# Patient Record
Sex: MALE | Race: WHITE | NOT HISPANIC OR LATINO | URBAN - METROPOLITAN AREA
[De-identification: names, ages, dates, MRNs, and addresses within clinical notes are randomized per-mention and may not be internally consistent; named-entity substitution may affect disease eponyms.]

---

## 2022-10-11 ENCOUNTER — HOSPITAL ENCOUNTER (OUTPATIENT)
Dept: TELEMEDICINE | Facility: HOSPITAL | Age: 52
Discharge: HOME | End: 2022-10-11

## 2022-10-11 DIAGNOSIS — R20.2 PARESTHESIAS WITH SUBJECTIVE WEAKNESS: ICD-10-CM

## 2022-10-11 DIAGNOSIS — R53.1 PARESTHESIAS WITH SUBJECTIVE WEAKNESS: ICD-10-CM

## 2022-10-11 PROCEDURE — G0425 INPT/ED TELECONSULT30: HCPCS | Mod: GT,,, | Performed by: STUDENT IN AN ORGANIZED HEALTH CARE EDUCATION/TRAINING PROGRAM

## 2022-10-11 PROCEDURE — G0425 PR INPT TELEHEALTH CONSULT 30M: ICD-10-PCS | Mod: GT,,, | Performed by: STUDENT IN AN ORGANIZED HEALTH CARE EDUCATION/TRAINING PROGRAM

## 2022-10-11 NOTE — HPI
Patient is a 51 yo male w/ PMHx of HLD, HTN, Tobacco abuse, PTSD, gout, recent admission to OSH w/ LSW on 9/20/2022 w/ tPA administration (of note MRI negative based on chart review) who presents with persistent but worsening symptoms since his admission on 9/20/2022.  Presents after worsening numbness and LLE pain radiating upwards that is worse today upon awakening at 6:30 AM today.   States that the symptoms never completely resolved after his admission on 9/20, but today they are worse, particularly worsening numbness.   He is also saying that it's difficult to speak today, stating that his tongue feels heavy.   Denies any other symptoms.

## 2022-10-11 NOTE — SUBJECTIVE & OBJECTIVE
Woke up with symptoms?: yes    Recent bleeding noted: no  Does the patient take any Blood Thinners? yes  Medications: Antiplatelets:  aspirin and clopidogrel/Plavix      Past Medical History: hypertension and hyperlipidemia    Past Surgical History: no major surgeries within the last 2 weeks    Family History: no relevant history    Social History: smoker (active)    Allergies: Allergies have not been reviewed No relevant allergies    Review of Systems   Constitutional: Negative.  Negative for diaphoresis and fatigue.   HENT: Negative.  Negative for trouble swallowing.    Eyes: Negative.  Negative for pain.   Respiratory: Negative.  Negative for shortness of breath.    Cardiovascular: Negative.    Gastrointestinal: Negative.  Negative for nausea and vomiting.   Endocrine: Negative.    Genitourinary: Negative.    Musculoskeletal: Negative.    Skin: Negative.    Allergic/Immunologic: Negative.    Neurological: Positive for speech difficulty and numbness.   Hematological: Negative.    Psychiatric/Behavioral: Negative.      Objective:   Vitals: There were no vitals taken for this visit. BP: 147/100 and Heart Rate: 73    CT READ: No    Physical Exam  Constitutional:       Appearance: Normal appearance.   HENT:      Head: Normocephalic and atraumatic.   Eyes:      Extraocular Movements: Extraocular movements intact.   Cardiovascular:      Rate and Rhythm: Normal rate.   Pulmonary:      Effort: Pulmonary effort is normal.   Musculoskeletal:         General: Normal range of motion.   Neurological:      Mental Status: He is alert and oriented to person, place, and time.      Cranial Nerves: No cranial nerve deficit.      Sensory: Sensory deficit present.      Motor: No weakness.      Comments: Reports LLE pain that radiates from his foot up to the head and back down.   States that his numbness is worse today.   No drift noted in any of the extremities.

## 2022-10-11 NOTE — CONSULTS
[facility]  Vascular Neurology  Comprehensive Stroke Center  TeleVascular Neurology Acute Consultation Note      Consults    Consulting Provider: WYATT KILGORE  Current Providers  No providers found    Patient Location: [facility] Emergency Department  Spoke hospital nurse at bedside with patient assisting consultant.     Patient information was obtained from patient, past medical records, and ER records.         Assessment/Plan:       Diagnoses:   * Paresthesias with subjective weakness  Patient is a 53 yo male w/ PMHx of HLD, HTN, Tobacco abuse, PTSD, gout, recent admission to [facility] w/ LSW on 9/20/2022 w/ tPA administration (of note MRI negative based on chart review) who presents with persistent but worsening symptoms since his admission on 9/20/2022.  Presents after worsening numbness and LLE pain radiating upwards that is worse today upon awakening at 6:30 AM today.   States that the symptoms never completely resolved after his admission on 9/20, but today they are worse, particularly worsening numbness.   He is also saying that it's difficult to speak today, stating that his tongue feels heavy.   Denies any other symptoms.     Patient is not a tPA candidate as these are the same symptoms as previously described w/ some fluctuating today as well as being OOW for tPA.     Per chart review, MRI negative on initial admission at the end of last month.     Agree w/ admission for observation and evaluation by Neurology w/ full neuro exam including reflexes, multi-modal sensory exam.   Can consider repeat MRI.     If examination is inconsistent w/ weakness of central origin, consider alternative etiologies.    Continue secondary stroke prevention modalities at this time.               STROKE DOCUMENTATION     Acute Stroke Times:   Acute Stroke Times   Last Known Normal Date: 10/10/22  Last Known Normal Time: 2200  Symptom Onset Date:  10/11/22  Symptom Onset Time: 0630  Stroke Team Called Date: 10/11/22  Stroke Team Called Time: 1352  Stroke Team Arrival Date: 10/11/22  Stroke Team Arrival Time: 1356  CT completed but images not available for review - spoke to document results:  (Reportedly negative CTH as per ED physiciam )  Alteplase Recommended: No    NIH Scale:  1a. Level of Consciousness: 0-->Alert, keenly responsive  1b. LOC Questions: 0-->Answers both questions correctly  1c. LOC Commands: 0-->Performs both tasks correctly  2. Best Gaze: 0-->Normal  3. Visual: 0-->No visual loss  4. Facial Palsy: 0-->Normal symmetrical movements  5a. Motor Arm, Left: 0-->No drift, limb holds 90 (or 45) degrees for full 10 secs  5b. Motor Arm, Right: 0-->No drift, limb holds 90 (or 45) degrees for full 10 secs  6a. Motor Leg, Left: 0-->No drift, leg holds 30 degree position for full 5 secs  6b. Motor Leg, Right: 0-->No drift, leg holds 30 degree position for full 5 secs  7. Limb Ataxia: 0-->Absent  8. Sensory: 1-->Mild-to-moderate sensory loss, patient feels pinprick is less sharp or is dull on the affected side, or there is a loss of superficial pain with pinprick, but patient is aware of being touched  9. Best Language: 0-->No aphasia, normal  10. Dysarthria: 0-->Normal  11. Extinction and Inattention (formerly Neglect): 0-->No abnormality  Total (NIH Stroke Scale): 1     Modified Vilma Score: 0  Filiberto Coma Scale:    ABCD2 Score:    [id number] Score:   HAS -BLED Score:   ICH Score:   Hunt & Monique Classification:       There were no vitals taken for this visit.  Alteplase Eligible?: No  Alteplase Recommendation: Alteplase not recommended due to Outside of treatment window   Possible Interventional Revascularization Candidate? No; at this time symptoms not suggestive of large vessel occlusion    Disposition Recommendation: remains as inpatient at [facility]     Subjective:     History of Present Illness:  Patient is a 53 yo male w/ PMHx of HLD, HTN, Tobacco  abuse, PTSD, gout, recent admission to [facility] w/ LSW on 9/20/2022 w/ tPA administration (of note MRI negative based on chart review) who presents with persistent but worsening symptoms since his admission on 9/20/2022.  Presents after worsening numbness and LLE pain radiating upwards that is worse today upon awakening at 6:30 AM today.   States that the symptoms never completely resolved after his admission on 9/20, but today they are worse, particularly worsening numbness.   He is also saying that it's difficult to speak today, stating that his tongue feels heavy.   Denies any other symptoms.       Woke up with symptoms?: yes    Recent bleeding noted: no  Does the patient take any Blood Thinners? yes  Medications: Antiplatelets:  aspirin and clopidogrel/Plavix      Past Medical History: hypertension and hyperlipidemia    Past Surgical History: no major surgeries within the last 2 weeks    Family History: no relevant history    Social History: smoker (active)    Allergies: Allergies have not been reviewed No relevant allergies    Review of Systems   Constitutional: Negative.  Negative for diaphoresis and fatigue.   HENT: Negative.  Negative for trouble swallowing.    Eyes: Negative.  Negative for pain.   Respiratory: Negative.  Negative for shortness of breath.    Cardiovascular: Negative.    Gastrointestinal: Negative.  Negative for nausea and vomiting.   Endocrine: Negative.    Genitourinary: Negative.    Musculoskeletal: Negative.    Skin: Negative.    Allergic/Immunologic: Negative.    Neurological: Positive for speech difficulty and numbness.   Hematological: Negative.    Psychiatric/Behavioral: Negative.      Objective:   Vitals: There were no vitals taken for this visit. BP: 147/100 and Heart Rate: 73    CT READ: No    Physical Exam  Constitutional:       Appearance: Normal appearance.   HENT:      Head: Normocephalic and atraumatic.   Eyes:      Extraocular Movements: Extraocular movements intact.    Cardiovascular:      Rate and Rhythm: Normal rate.   Pulmonary:      Effort: Pulmonary effort is normal.   Musculoskeletal:         General: Normal range of motion.   Neurological:      Mental Status: He is alert and oriented to person, place, and time.      Cranial Nerves: No cranial nerve deficit.      Sensory: Sensory deficit present.      Motor: No weakness.      Comments: Reports LLE pain that radiates from his foot up to the head and back down.   States that his numbness is worse today.   No drift noted in any of the extremities.              Recommended the emergency room physician to have a brief discussion with the patient and/or family if available regarding the  risks and benefits of treatment, and to briefly document the occurrence of that discussion in his clinical encounter note.     The attending portion of this evaluation, treatment, and documentation was performed per Fouzia Conde MD via audiovisual.    Billing code:  (non-intervention mild to moderate stroke, TIA, some mimics)      This patient has a critical neurological condition/illness, with some potential for high morbidity and mortality.  There is a moderate probability for acute neurological change leading to clinical and possibly life-threatening deterioration requiring highest level of physician preparedness for urgent intervention.  Care was coordinated with other physicians involved in the patient's care.  Radiologic studies and laboratory data were reviewed and interpreted, and plan of care was re-assessed based on the results.  Diagnosis, treatment options and prognosis may have been discussed with the patient and/or family members or caregiver.    In your opinion, this was a: Tier 2 Van Negative    Consult End Time: 3:46 PM     Fouzia Conde MD  [facility] Stroke [facility]  Vascular Neurology   [facility]

## 2022-10-11 NOTE — ASSESSMENT & PLAN NOTE
Patient is a 53 yo male w/ PMHx of HLD, HTN, Tobacco abuse, PTSD, gout, recent admission to OSH w/ LSW on 9/20/2022 w/ tPA administration (of note MRI negative based on chart review) who presents with persistent but worsening symptoms since his admission on 9/20/2022.  Presents after worsening numbness and LLE pain radiating upwards that is worse today upon awakening at 6:30 AM today.   States that the symptoms never completely resolved after his admission on 9/20, but today they are worse, particularly worsening numbness.   He is also saying that it's difficult to speak today, stating that his tongue feels heavy.   Denies any other symptoms.     Patient is not a tPA candidate as these are the same symptoms as previously described w/ some fluctuating today as well as being OOW for tPA.     Per chart review, MRI negative on initial admission at the end of last month.     Agree w/ admission for observation and evaluation by Neurology w/ full neuro exam including reflexes, multi-modal sensory exam.   Can consider repeat MRI.     If examination is inconsistent w/ weakness of central origin, consider alternative etiologies.    Continue secondary stroke prevention modalities at this time.

## 2023-06-07 DIAGNOSIS — J84.10 PULMONARY FIBROSIS, UNSPECIFIED: Primary | ICD-10-CM

## 2023-10-16 ENCOUNTER — TELEPHONE (OUTPATIENT)
Dept: PULMONOLOGY | Facility: CLINIC | Age: 53
End: 2023-10-16
Payer: OTHER GOVERNMENT

## 2023-11-14 LAB — CRC RECOMMENDATION EXT: NORMAL

## 2024-02-02 LAB
LEFT EYE DM RETINOPATHY: NEGATIVE
RIGHT EYE DM RETINOPATHY: NEGATIVE

## 2024-04-16 DIAGNOSIS — J84.10 PULMONARY FIBROSIS: Primary | ICD-10-CM

## 2024-04-17 DIAGNOSIS — J84.10 PULMONARY FIBROSIS: ICD-10-CM

## 2024-04-17 DIAGNOSIS — R06.02 SOB (SHORTNESS OF BREATH): Primary | ICD-10-CM

## 2025-04-02 ENCOUNTER — OFFICE VISIT (OUTPATIENT)
Dept: FAMILY MEDICINE | Facility: CLINIC | Age: 55
End: 2025-04-02
Payer: OTHER GOVERNMENT

## 2025-04-02 VITALS
DIASTOLIC BLOOD PRESSURE: 82 MMHG | TEMPERATURE: 99 F | HEIGHT: 71 IN | SYSTOLIC BLOOD PRESSURE: 118 MMHG | HEART RATE: 88 BPM | WEIGHT: 213.81 LBS | RESPIRATION RATE: 18 BRPM | BODY MASS INDEX: 29.93 KG/M2 | OXYGEN SATURATION: 98 %

## 2025-04-02 DIAGNOSIS — E78.2 MIXED HYPERLIPIDEMIA: ICD-10-CM

## 2025-04-02 DIAGNOSIS — E11.9 TYPE 2 DIABETES MELLITUS WITHOUT COMPLICATION, WITHOUT LONG-TERM CURRENT USE OF INSULIN: ICD-10-CM

## 2025-04-02 DIAGNOSIS — J44.9 CHRONIC OBSTRUCTIVE PULMONARY DISEASE, UNSPECIFIED COPD TYPE: ICD-10-CM

## 2025-04-02 DIAGNOSIS — N18.2 CKD (CHRONIC KIDNEY DISEASE) STAGE 2, GFR 60-89 ML/MIN: ICD-10-CM

## 2025-04-02 DIAGNOSIS — I63.9 CEREBROVASCULAR ACCIDENT (CVA), UNSPECIFIED MECHANISM: ICD-10-CM

## 2025-04-02 DIAGNOSIS — I10 PRIMARY HYPERTENSION: ICD-10-CM

## 2025-04-02 DIAGNOSIS — K80.20 GALLSTONES: ICD-10-CM

## 2025-04-02 DIAGNOSIS — R91.1 LUNG NODULE: ICD-10-CM

## 2025-04-02 DIAGNOSIS — G89.29 CHRONIC BILATERAL LOW BACK PAIN WITHOUT SCIATICA: Primary | ICD-10-CM

## 2025-04-02 DIAGNOSIS — M54.50 CHRONIC BILATERAL LOW BACK PAIN WITHOUT SCIATICA: Primary | ICD-10-CM

## 2025-04-02 DIAGNOSIS — R31.9 HEMATURIA, UNSPECIFIED TYPE: ICD-10-CM

## 2025-04-02 PROBLEM — R53.1 PARESTHESIAS WITH SUBJECTIVE WEAKNESS: Status: RESOLVED | Noted: 2022-10-11 | Resolved: 2025-04-02

## 2025-04-02 PROBLEM — R20.2 PARESTHESIAS WITH SUBJECTIVE WEAKNESS: Status: RESOLVED | Noted: 2022-10-11 | Resolved: 2025-04-02

## 2025-04-02 PROBLEM — E78.5 HLD (HYPERLIPIDEMIA): Status: ACTIVE | Noted: 2025-04-02

## 2025-04-02 RX ORDER — PROPRANOLOL HYDROCHLORIDE 20 MG/1
10 TABLET ORAL
COMMUNITY
Start: 2025-02-18

## 2025-04-02 RX ORDER — ALPRAZOLAM 0.25 MG/1
TABLET ORAL
COMMUNITY
Start: 2024-11-12

## 2025-04-02 RX ORDER — CLOPIDOGREL BISULFATE 75 MG/1
75 TABLET ORAL
COMMUNITY
Start: 2024-11-13

## 2025-04-02 RX ORDER — GABAPENTIN 300 MG/1
300 CAPSULE ORAL NIGHTLY
Qty: 30 CAPSULE | Refills: 2 | Status: SHIPPED | OUTPATIENT
Start: 2025-04-02 | End: 2026-04-02

## 2025-04-02 RX ORDER — ATORVASTATIN CALCIUM 40 MG/1
40 TABLET, FILM COATED ORAL
COMMUNITY
Start: 2024-04-23

## 2025-04-02 RX ORDER — LISINOPRIL AND HYDROCHLOROTHIAZIDE 20; 25 MG/1; MG/1
1 TABLET ORAL DAILY PRN
COMMUNITY
Start: 2024-04-23

## 2025-04-02 RX ORDER — HYDROCODONE BITARTRATE AND ACETAMINOPHEN 5; 325 MG/1; MG/1
1 TABLET ORAL EVERY 8 HOURS PRN
Qty: 10 TABLET | Refills: 0 | Status: SHIPPED | OUTPATIENT
Start: 2025-04-02
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
## 2025-04-03 LAB — HBA1C MFR BLD: 6 %

## 2025-04-07 RX ORDER — LISINOPRIL AND HYDROCHLOROTHIAZIDE 20; 25 MG/1; MG/1
1 TABLET ORAL DAILY
Qty: 90 TABLET | Refills: 1 | Status: SHIPPED | OUTPATIENT
Start: 2025-04-07

## 2025-04-16 ENCOUNTER — TELEPHONE (OUTPATIENT)
Dept: FAMILY MEDICINE | Facility: CLINIC | Age: 55
End: 2025-04-16
Payer: OTHER GOVERNMENT

## 2025-04-16 DIAGNOSIS — J44.9 CHRONIC OBSTRUCTIVE PULMONARY DISEASE, UNSPECIFIED COPD TYPE: Primary | ICD-10-CM

## 2025-04-16 RX ORDER — ALBUTEROL SULFATE 90 UG/1
2 INHALANT RESPIRATORY (INHALATION) EVERY 6 HOURS PRN
Qty: 18 G | Refills: 0 | Status: SHIPPED | OUTPATIENT
Start: 2025-04-16 | End: 2026-04-16

## 2025-04-24 ENCOUNTER — TELEPHONE (OUTPATIENT)
Dept: FAMILY MEDICINE | Facility: CLINIC | Age: 55
End: 2025-04-24
Payer: OTHER GOVERNMENT

## 2025-04-24 NOTE — TELEPHONE ENCOUNTER
Pt called reporting continued SOB, darnell only working for 2h at a time. Per previous encounter, pt instructed to  Breztri samples per Dr. Archuleta, and notified of ventolin sent to pharmacy. Pt verbalized understanding.

## 2025-05-05 PROBLEM — K80.20 CHOLELITHIASIS: Status: ACTIVE | Noted: 2025-05-05

## 2025-05-06 PROBLEM — M48.00 SPINAL STENOSIS: Status: ACTIVE | Noted: 2025-05-06

## 2025-05-06 PROBLEM — N52.9 ERECTILE DISORDER: Status: ACTIVE | Noted: 2025-05-06

## 2025-05-06 PROBLEM — K20.90 ESOPHAGITIS: Status: ACTIVE | Noted: 2025-05-06

## 2025-05-06 PROBLEM — E53.8 B12 DEFICIENCY: Status: ACTIVE | Noted: 2025-05-06

## 2025-05-06 PROBLEM — E55.9 VITAMIN D DEFICIENCY: Status: ACTIVE | Noted: 2025-05-06

## 2025-05-06 PROBLEM — F43.10 PTSD (POST-TRAUMATIC STRESS DISORDER): Status: ACTIVE | Noted: 2025-05-06

## 2025-05-06 PROBLEM — Z12.11 SCREENING FOR MALIGNANT NEOPLASM OF COLON: Status: ACTIVE | Noted: 2025-05-06

## 2025-05-06 PROBLEM — G47.33 OSA (OBSTRUCTIVE SLEEP APNEA): Status: ACTIVE | Noted: 2025-05-06

## 2025-05-06 PROBLEM — Z12.5 SCREENING FOR MALIGNANT NEOPLASM OF PROSTATE: Status: ACTIVE | Noted: 2025-05-06

## 2025-05-06 PROBLEM — F32.A DEPRESSION: Status: ACTIVE | Noted: 2025-05-06

## 2025-05-14 ENCOUNTER — PATIENT OUTREACH (OUTPATIENT)
Dept: ADMINISTRATIVE | Facility: HOSPITAL | Age: 55
End: 2025-05-14
Payer: OTHER GOVERNMENT

## 2025-05-14 NOTE — PROGRESS NOTES
Manually uploaded and hyper-linked DM EYE EXAM 02/02/24  Manually uploaded and hyper-linked COLONOSCOPY & PATHOLOGY 11/14/23

## 2025-06-23 ENCOUNTER — TELEPHONE (OUTPATIENT)
Dept: FAMILY MEDICINE | Facility: CLINIC | Age: 55
End: 2025-06-23
Payer: OTHER GOVERNMENT

## 2025-06-23 NOTE — TELEPHONE ENCOUNTER
Copied from CRM #[id number]. Topic: General Inquiry - Patient Advice  >> Jun 19, 2025  2:56 PM Lora wrote:  Type:  Patient Requesting Call Back    Who Called:Curtis Mari  Does the patient know what this is regarding?:pt fell and rt knee is bothering him  Would the patient rather a call back or a response via [facility]?   Best Call Back Number:[phone]  Additional Information: n/a

## 2025-07-01 ENCOUNTER — PATIENT MESSAGE (OUTPATIENT)
Dept: FAMILY MEDICINE | Facility: CLINIC | Age: 55
End: 2025-07-01

## 2025-07-01 ENCOUNTER — OFFICE VISIT (OUTPATIENT)
Dept: FAMILY MEDICINE | Facility: CLINIC | Age: 55
End: 2025-07-01
Payer: OTHER GOVERNMENT

## 2025-07-01 VITALS
RESPIRATION RATE: 20 BRPM | HEIGHT: 71 IN | TEMPERATURE: 97 F | DIASTOLIC BLOOD PRESSURE: 90 MMHG | WEIGHT: 224 LBS | BODY MASS INDEX: 31.36 KG/M2 | OXYGEN SATURATION: 98 % | HEART RATE: 97 BPM | SYSTOLIC BLOOD PRESSURE: 134 MMHG

## 2025-07-01 DIAGNOSIS — R21 RASH: ICD-10-CM

## 2025-07-01 DIAGNOSIS — M17.0 PRIMARY OSTEOARTHRITIS OF BOTH KNEES: Primary | ICD-10-CM

## 2025-07-01 DIAGNOSIS — M54.50 CHRONIC BILATERAL LOW BACK PAIN WITHOUT SCIATICA: ICD-10-CM

## 2025-07-01 DIAGNOSIS — J44.9 CHRONIC OBSTRUCTIVE PULMONARY DISEASE, UNSPECIFIED COPD TYPE: ICD-10-CM

## 2025-07-01 DIAGNOSIS — R91.1 LUNG NODULE: ICD-10-CM

## 2025-07-01 DIAGNOSIS — G89.29 CHRONIC BILATERAL LOW BACK PAIN WITHOUT SCIATICA: ICD-10-CM

## 2025-07-01 DIAGNOSIS — R91.1 SOLITARY PULMONARY NODULE: ICD-10-CM

## 2025-07-01 PROCEDURE — 99214 OFFICE O/P EST MOD 30 MIN: CPT | Mod: S$GLB,,, | Performed by: FAMILY MEDICINE

## 2025-07-01 RX ORDER — IPRATROPIUM BROMIDE AND ALBUTEROL SULFATE 2.5; .5 MG/3ML; MG/3ML
3 SOLUTION RESPIRATORY (INHALATION) EVERY 6 HOURS PRN
Qty: 360 ML | Refills: 3 | Status: SHIPPED | OUTPATIENT
Start: 2025-07-01 | End: 2026-07-01

## 2025-07-01 RX ORDER — BETAMETHASONE VALERATE 1 MG/G
CREAM TOPICAL 2 TIMES DAILY
Qty: 45 G | Refills: 1 | Status: SHIPPED | OUTPATIENT
Start: 2025-07-01

## 2025-07-01 RX ORDER — GABAPENTIN 300 MG/1
300 CAPSULE ORAL 3 TIMES DAILY
Qty: 90 CAPSULE | Refills: 3 | Status: SHIPPED | OUTPATIENT
Start: 2025-07-01 | End: 2026-07-01

## 2025-07-01 RX ORDER — BUDESONIDE 0.5 MG/2ML
0.5 INHALANT ORAL 2 TIMES DAILY
Qty: 120 ML | Refills: 3 | Status: SHIPPED | OUTPATIENT
Start: 2025-07-01 | End: 2026-07-01

## 2025-07-01 NOTE — PROGRESS NOTES
Subjective:      Patient ID: Curtis Mari is a 55 y.o. male.    Chief Complaint: Follow-up, Knee Pain, and Rash      HPI:  55-year-old male who presents for shortness a breath.  Has cut back to smoking to half pack a day.  Worried he may have some COPD from [place]. Gets 1 or 2 hour improvement from Trelegy breast treat and albuterol inhaler.  Does have a rash on bilateral knees.  Itches.  Not sure where it came from.  Has bilateral knee pain.  Had surgery on his right knee.  Had injections with minimal improvement.  No improvement with back pain from gabapentin.  [facility]    Past Medical History:   Diagnosis Date    Depression     Diabetes mellitus, type 2     Hyperlipidemia     Hypertension     Stroke      Past Surgical History:   Procedure Laterality Date    JOINT REPLACEMENT Right      No family history on file.  Social History[1]  Review of patient's allergies indicates:  No Known Allergies    Review of Systems   Constitutional:  Negative for activity change, appetite change, chills, fatigue, fever and unexpected weight change.   HENT:  Positive for congestion. Negative for ear pain, rhinorrhea, sinus pressure, sinus pain, sneezing, sore throat and trouble swallowing.    Eyes:  Negative for photophobia, pain and itching.   Respiratory:  Positive for shortness of breath. Negative for cough, chest tightness and wheezing.    Cardiovascular:  Negative for chest pain, palpitations and leg swelling.   Gastrointestinal:  Negative for abdominal distention, abdominal pain, constipation, diarrhea, nausea and vomiting.   Endocrine: Negative for cold intolerance, heat intolerance, polydipsia and polyphagia.   Genitourinary:  Negative for difficulty urinating, dysuria and frequency.   Musculoskeletal:  Positive for arthralgias and back pain. Negative for joint swelling and myalgias.   Skin:  Positive for rash. Negative for pallor.   Neurological:  Negative for  "dizziness, seizures, syncope, speech difficulty and headaches.   Hematological:  Negative for adenopathy. Does not bruise/bleed easily.   Psychiatric/Behavioral:  Negative for agitation, behavioral problems and hallucinations.        Objective:       BP (!) 134/90 (BP Location: Left forearm, Patient Position: Sitting)   Pulse 97   Temp 97 °F (36.1 °C) (Oral)   Resp 20   Ht 5' 11" (1.803 m)   Wt 101.6 kg (224 lb)   SpO2 98%   BMI 31.24 kg/m²   Physical Exam  Vitals and nursing note reviewed.   Constitutional:       Appearance: He is well-developed.   HENT:      Head: Normocephalic and atraumatic.      Nose: Nose normal.   Eyes:      Conjunctiva/sclera: Conjunctivae normal.      Pupils: Pupils are equal, round, and reactive to light.   Cardiovascular:      Rate and Rhythm: Normal rate and regular rhythm.      Heart sounds: Normal heart sounds.   Pulmonary:      Effort: Pulmonary effort is normal.      Breath sounds: Wheezing, rhonchi and rales present.   Abdominal:      Palpations: Abdomen is soft.      Tenderness: There is no abdominal tenderness.   Musculoskeletal:         General: Normal range of motion.      Cervical back: Normal range of motion and neck supple.      Comments: Crepitus bilateral knees   Skin:     General: Skin is warm and dry.      Findings: Rash present.   Neurological:      Mental Status: He is alert and oriented to person, place, and time.   Psychiatric:         Behavior: Behavior normal.         Thought Content: Thought content normal.         Judgment: Judgment normal.         Assessment:     1. Primary osteoarthritis of both knees    2. Chronic obstructive pulmonary disease, unspecified COPD type    3. Lung nodule    4. Chronic bilateral low back pain without sciatica    5. Rash    6. Solitary pulmonary nodule        Plan:   Primary osteoarthritis of both knees  -     X-Ray Knee 1 or 2 View Bilateral; Future; Expected date: 07/01/2025    Chronic obstructive pulmonary disease, " unspecified COPD type  -     NEBULIZER FOR HOME USE  -     budesonide (PULMICORT) 0.5 mg/2 mL nebulizer solution; Take 2 mLs (0.5 mg total) by nebulization 2 (two) times daily. Controller  Dispense: 120 mL; Refill: 3  -     albuterol-ipratropium (DUO-NEB) 2.5 mg-0.5 mg/3 mL nebulizer solution; Take 3 mLs by nebulization every 6 (six) hours as needed for Wheezing. Rescue  Dispense: 360 mL; Refill: 3    Lung nodule    Chronic bilateral low back pain without sciatica  -     gabapentin (NEURONTIN) 300 MG capsule; Take 1 capsule (300 mg total) by mouth 3 (three) times daily.  Dispense: 90 capsule; Refill: 3    Rash  -     betamethasone valerate 0.1% (VALISONE) 0.1 % Crea; Apply topically 2 (two) times daily.  Dispense: 45 g; Refill: 1    Solitary pulmonary nodule  -     CT Chest Without Contrast; Future; Expected date: 07/01/2025      Order CT chest     Consider PFT     Trial of budesonide     Trial of albuterol     Increase gabapentin     Follow-up in 6-8 weeks.  Sooner if needed     [facility]    Medication List with Changes/Refills   New Medications    ALBUTEROL-IPRATROPIUM (DUO-NEB) 2.5 MG-0.5 MG/3 ML NEBULIZER SOLUTION    Take 3 mLs by nebulization every 6 (six) hours as needed for Wheezing. Rescue    BETAMETHASONE VALERATE 0.1% (VALISONE) 0.1 % CREA    Apply topically 2 (two) times daily.    BUDESONIDE (PULMICORT) 0.5 MG/2 ML NEBULIZER SOLUTION    Take 2 mLs (0.5 mg total) by nebulization 2 (two) times daily. Controller   Current Medications    ALBUTEROL (VENTOLIN HFA) 90 MCG/ACTUATION INHALER    Inhale 2 puffs into the lungs every 6 (six) hours as needed for Wheezing. Rescue    ALPRAZOLAM (XANAX) 0.25 MG TABLET        ATORVASTATIN (LIPITOR) 40 MG TABLET    Take 40 mg by mouth.    BREXPIPRAZOLE (REXULTI) 3 MG TAB    Take 3 mg by mouth.    BUSPIRONE HCL (BUSPIRONE ORAL)        CLOPIDOGREL (PLAVIX) 75 MG TABLET    Take 75 mg by mouth.    HYDROCODONE-ACETAMINOPHEN (NORCO) 5-325 MG PER TABLET    Take  1 tablet by mouth every 8 (eight) hours as needed for Pain.    LISINOPRIL-HYDROCHLOROTHIAZIDE (PRINZIDE,ZESTORETIC) 20-25 MG TAB    Take 1 tablet by mouth once daily.    PROPRANOLOL (INDERAL) 20 MG TABLET    Take 10 mg by mouth.    VENLAFAXINE HCL (EFFEXOR XR ORAL)       Changed and/or Refilled Medications    Modified Medication Previous Medication    GABAPENTIN (NEURONTIN) 300 MG CAPSULE gabapentin (NEURONTIN) 300 MG capsule       Take 1 capsule (300 mg total) by mouth 3 (three) times daily.    Take 1 capsule (300 mg total) by mouth every evening.            Disclaimer: This note may have been prepared using voice recognition software, it may have not been extensively proofed, as such there could be errors within the text such as sound alike errors.          [1]   Social History  Socioeconomic History    Marital status: Unknown   Tobacco Use    Smoking status: Never    Smokeless tobacco: Never     Social Drivers of Health     Financial Resource Strain: Low Risk  (3/26/2025)    Overall Financial Resource Strain (CARDIA)     Difficulty of Paying Living Expenses: Not hard at all   Food Insecurity: No Food Insecurity (3/26/2025)    Hunger Vital Sign     Worried About Running Out of Food in the Last Year: Never true     Ran Out of Food in the Last Year: Never true   Transportation Needs: No Transportation Needs (3/26/2025)    PRAPARE - Transportation     Lack of Transportation (Medical): No     Lack of Transportation (Non-Medical): No   Physical Activity: Inactive (3/26/2025)    Exercise Vital Sign     Days of Exercise per Week: 0 days     Minutes of Exercise per Session: 0 min   Stress: Stress Concern Present (3/26/2025)    [employer] - Occupational Stress Questionnaire     Feeling of Stress : Very much   Housing Stability: Low Risk  (3/26/2025)    Housing Stability Vital Sign     Unable to Pay for Housing in the Last Year: No     Number of Times Moved in the Last Year: 0     Homeless in  the Last Year: No

## 2025-07-02 PROBLEM — M48.062 SPINAL STENOSIS OF LUMBAR REGION WITH NEUROGENIC CLAUDICATION: Status: ACTIVE | Noted: 2025-07-02

## 2025-07-18 ENCOUNTER — TELEPHONE (OUTPATIENT)
Dept: FAMILY MEDICINE | Facility: CLINIC | Age: 55
End: 2025-07-18
Payer: OTHER GOVERNMENT

## 2025-07-18 NOTE — TELEPHONE ENCOUNTER
Order for nebulizer faxed to [facility] 7/7/25. No answer, LVM to notify pt.    Copied from CRM #[id number]. Topic: Medications - Medication Refill  >> Jul 18, 2025 10:10 AM Marcela wrote:  Type:  RX Refill Request    Who Called: pt  Refill or New Rx:new  RX Name and Strength:nebulizer machine  Preferred Pharmacy with phone number:  [facility] - [place], [place] - [address]  [address]  [place] [address]  Phone: [phone] Fax: [phone]  Local or Mail Order:local  Ordering Provider:Geremias  Would the patient rather a call back or a response via [facility]? call  Best Call Back Number:[phone]  Additional Information: requesting a script for nebulizer as pharmacy is requiring a script

## 2025-07-30 DIAGNOSIS — M54.50 CHRONIC BILATERAL LOW BACK PAIN WITHOUT SCIATICA: ICD-10-CM

## 2025-07-30 DIAGNOSIS — G89.29 CHRONIC BILATERAL LOW BACK PAIN WITHOUT SCIATICA: ICD-10-CM

## 2025-07-30 RX ORDER — HYDROCODONE BITARTRATE AND ACETAMINOPHEN 5; 325 MG/1; MG/1
1 TABLET ORAL EVERY 8 HOURS PRN
Qty: 10 TABLET | Refills: 0 | OUTPATIENT
Start: 2025-07-30

## 2025-07-31 NOTE — TELEPHONE ENCOUNTER
LVM advising pt an appointment is needed for refill of hydrocodone          Copied from CRM #[id number]. Topic: General Inquiry - Return Call  >> Jul 30, 2025  4:45 PM Perla wrote:  Patient requesting call back in regards to medicationHYDROcodone-acetaminophen (NORCO) 5-325 mg per tablet please call him back [phone]

## 2025-08-01 ENCOUNTER — OFFICE VISIT (OUTPATIENT)
Dept: FAMILY MEDICINE | Facility: CLINIC | Age: 55
End: 2025-08-01
Payer: OTHER GOVERNMENT

## 2025-08-01 VITALS
RESPIRATION RATE: 20 BRPM | SYSTOLIC BLOOD PRESSURE: 118 MMHG | TEMPERATURE: 97 F | HEART RATE: 84 BPM | DIASTOLIC BLOOD PRESSURE: 80 MMHG | BODY MASS INDEX: 31.22 KG/M2 | HEIGHT: 71 IN | WEIGHT: 223 LBS

## 2025-08-01 DIAGNOSIS — M17.0 PRIMARY OSTEOARTHRITIS OF BOTH KNEES: ICD-10-CM

## 2025-08-01 DIAGNOSIS — E11.9 TYPE 2 DIABETES MELLITUS WITHOUT COMPLICATION, WITHOUT LONG-TERM CURRENT USE OF INSULIN: Primary | ICD-10-CM

## 2025-08-01 DIAGNOSIS — J44.9 CHRONIC OBSTRUCTIVE PULMONARY DISEASE, UNSPECIFIED COPD TYPE: ICD-10-CM

## 2025-08-01 DIAGNOSIS — I63.9 CEREBROVASCULAR ACCIDENT (CVA), UNSPECIFIED MECHANISM: ICD-10-CM

## 2025-08-01 LAB — GLUCOSE SERPL-MCNC: 140 MG/DL (ref 70–110)

## 2025-08-01 RX ORDER — HYDROCODONE BITARTRATE AND ACETAMINOPHEN 10; 325 MG/1; MG/1
1 TABLET ORAL EVERY 8 HOURS PRN
Qty: 20 TABLET | Refills: 0 | Status: SHIPPED | OUTPATIENT
Start: 2025-08-01

## 2025-08-01 NOTE — PROGRESS NOTES
Subjective:      Patient ID: Curtis Mari is a 55 y.o. male.    Chief Complaint: Follow-up      HPI:  Presents for continued back and knee pain.  Right knee has been giving out on him.  Partial replacement.  Fell and it did recently.  Fell because his knee gave out.  Having back pain.  Taking gabapentin.  It does seem to help but can not take it during the day.  Makes him very sleepy.  Would like a short course of pain medicine now for his back and knee.  Would be open to hearing about possible knee replacement.  Would like to follow up with cardiologist.  Has had multiple CVAs.  Reports he did a recent functional status.  Was noticed to have some left-sided weakness.  Thinks this could be secondary to the stroke Budesonide does seem to be helping with his COPD.  Using twice a day.  Using albuterol as needed  Has not heard anything about his PFTs or CT  Past Medical History:   Diagnosis Date    Depression     Diabetes mellitus, type 2     Hyperlipidemia     Hypertension     Stroke      Past Surgical History:   Procedure Laterality Date    JOINT REPLACEMENT Right      No family history on file.  Social History[1]  Review of patient's allergies indicates:  No Known Allergies    Review of Systems   Constitutional:  Negative for activity change, appetite change, chills, fatigue, fever and unexpected weight change.   HENT:  Negative for congestion, ear pain, rhinorrhea, sinus pressure, sinus pain, sneezing, sore throat and trouble swallowing.    Eyes:  Negative for photophobia, pain and itching.   Respiratory:  Positive for shortness of breath. Negative for cough, chest tightness and wheezing.    Cardiovascular:  Negative for chest pain, palpitations and leg swelling.   Gastrointestinal:  Negative for abdominal distention, abdominal pain, constipation, diarrhea, nausea and vomiting.   Endocrine: Negative for cold intolerance, heat intolerance, polydipsia and polyphagia.   Genitourinary:  Negative for difficulty  "urinating, dysuria and frequency.   Musculoskeletal:  Positive for arthralgias and back pain. Negative for joint swelling and myalgias.   Skin:  Negative for pallor and rash.   Neurological:  Positive for weakness. Negative for dizziness, seizures, syncope, speech difficulty and headaches.   Hematological:  Negative for adenopathy. Does not bruise/bleed easily.   Psychiatric/Behavioral:  Negative for agitation, behavioral problems and hallucinations.        Objective:       /80 (BP Location: Left forearm, Patient Position: Sitting)   Pulse 84   Temp 97 °F (36.1 °C) (Oral)   Resp 20   Ht 5' 11" (1.803 m)   Wt 101.2 kg (223 lb)   BMI 31.10 kg/m²   Physical Exam  Vitals and nursing note reviewed.   Constitutional:       Appearance: He is well-developed.   HENT:      Head: Normocephalic and atraumatic.      Nose: Nose normal.   Eyes:      Conjunctiva/sclera: Conjunctivae normal.      Pupils: Pupils are equal, round, and reactive to light.   Cardiovascular:      Rate and Rhythm: Normal rate and regular rhythm.      Heart sounds: Normal heart sounds.   Pulmonary:      Effort: Pulmonary effort is normal.      Breath sounds: Normal breath sounds.   Abdominal:      Palpations: Abdomen is soft.      Tenderness: There is no abdominal tenderness.   Musculoskeletal:         General: Normal range of motion.      Cervical back: Normal range of motion and neck supple.   Skin:     General: Skin is warm and dry.   Neurological:      Mental Status: He is alert and oriented to person, place, and time.   Psychiatric:         Behavior: Behavior normal.         Thought Content: Thought content normal.         Judgment: Judgment normal.         Assessment:     1. Type 2 diabetes mellitus without complication, without long-term current use of insulin    2. Primary osteoarthritis of both knees    3. Cerebrovascular accident (CVA), unspecified mechanism    4. Chronic obstructive pulmonary disease, unspecified COPD type  "       Plan:   Type 2 diabetes mellitus without complication, without long-term current use of insulin  -     POCT Glucose, Hand-Held Device    Primary osteoarthritis of both knees  -     HYDROcodone-acetaminophen (NORCO)  mg per tablet; Take 1 tablet by mouth every 8 (eight) hours as needed for Pain.  Dispense: 20 tablet; Refill: 0  -     Ambulatory referral/consult to Orthopedics; Future; Expected date: 08/08/2025    Cerebrovascular accident (CVA), unspecified mechanism  -     Ambulatory referral/consult to Cardiology; Future; Expected date: 08/08/2025    Chronic obstructive pulmonary disease, unspecified COPD type  -     Complete PFT w/ bronchodilator; Future      Refer to cardiologist     Refer to orthopedist     Recent PFT     Will see why patient has not heard anything about CT     [place] August [place]         Medication List with Changes/Refills   New Medications    HYDROCODONE-ACETAMINOPHEN (NORCO)  MG PER TABLET    Take 1 tablet by mouth every 8 (eight) hours as needed for Pain.   Current Medications    ALBUTEROL (VENTOLIN HFA) 90 MCG/ACTUATION INHALER    Inhale 2 puffs into the lungs every 6 (six) hours as needed for Wheezing. Rescue    ALBUTEROL-IPRATROPIUM (DUO-NEB) 2.5 MG-0.5 MG/3 ML NEBULIZER SOLUTION    Take 3 mLs by nebulization every 6 (six) hours as needed for Wheezing. Rescue    ALPRAZOLAM (XANAX) 0.25 MG TABLET        ATORVASTATIN (LIPITOR) 40 MG TABLET    Take 40 mg by mouth.    BETAMETHASONE VALERATE 0.1% (VALISONE) 0.1 % CREA    Apply topically 2 (two) times daily.    BREXPIPRAZOLE (REXULTI) 3 MG TAB    Take 3 mg by mouth.    BUDESONIDE (PULMICORT) 0.5 MG/2 ML NEBULIZER SOLUTION    Take 2 mLs (0.5 mg total) by nebulization 2 (two) times daily. Controller    BUSPIRONE HCL (BUSPIRONE ORAL)        CLOPIDOGREL (PLAVIX) 75 MG TABLET    Take 75 mg by mouth.    GABAPENTIN (NEURONTIN) 300 MG CAPSULE    Take 1 capsule (300 mg total) by mouth 3 (three)  times daily.    HYDROCODONE-ACETAMINOPHEN (NORCO) 5-325 MG PER TABLET    Take 1 tablet by mouth every 8 (eight) hours as needed for Pain.    LISINOPRIL-HYDROCHLOROTHIAZIDE (PRINZIDE,ZESTORETIC) 20-25 MG TAB    Take 1 tablet by mouth once daily.    PROPRANOLOL (INDERAL) 20 MG TABLET    Take 10 mg by mouth.    VENLAFAXINE HCL (EFFEXOR XR ORAL)                Disclaimer: This note may have been prepared using voice recognition software, it may have not been extensively proofed, as such there could be errors within the text such as sound alike errors.          [1]   Social History  Socioeconomic History    Marital status: Unknown   Tobacco Use    Smoking status: Never    Smokeless tobacco: Never     Social Drivers of Health     Financial Resource Strain: Low Risk  (3/26/2025)    Overall Financial Resource Strain (CARDIA)     Difficulty of Paying Living Expenses: Not hard at all   Food Insecurity: No Food Insecurity (3/26/2025)    Hunger Vital Sign     Worried About Running Out of Food in the Last Year: Never true     Ran Out of Food in the Last Year: Never true   Transportation Needs: No Transportation Needs (3/26/2025)    PRAPARE - Transportation     Lack of Transportation (Medical): No     Lack of Transportation (Non-Medical): No   Physical Activity: Inactive (3/26/2025)    Exercise Vital Sign     Days of Exercise per Week: 0 days     Minutes of Exercise per Session: 0 min   Stress: Stress Concern Present (3/26/2025)    [employer] - Occupational Stress Questionnaire     Feeling of Stress : Very much   Housing Stability: Low Risk  (3/26/2025)    Housing Stability Vital Sign     Unable to Pay for Housing in the Last Year: No     Number of Times Moved in the Last Year: 0     Homeless in the Last Year: No

## 2025-08-12 ENCOUNTER — TELEPHONE (OUTPATIENT)
Dept: FAMILY MEDICINE | Facility: CLINIC | Age: 55
End: 2025-08-12

## 2025-08-12 DIAGNOSIS — M17.0 PRIMARY OSTEOARTHRITIS OF BOTH KNEES: ICD-10-CM

## 2025-08-12 RX ORDER — HYDROCODONE BITARTRATE AND ACETAMINOPHEN 10; 325 MG/1; MG/1
1 TABLET ORAL EVERY 8 HOURS PRN
Qty: 20 TABLET | Refills: 0 | Status: SHIPPED | OUTPATIENT
Start: 2025-08-12

## 2025-08-15 ENCOUNTER — PATIENT MESSAGE (OUTPATIENT)
Dept: FAMILY MEDICINE | Facility: CLINIC | Age: 55
End: 2025-08-15
Payer: OTHER GOVERNMENT

## 2025-08-18 DIAGNOSIS — I63.9 CEREBROVASCULAR ACCIDENT (CVA), UNSPECIFIED MECHANISM: Primary | ICD-10-CM

## 2025-08-18 DIAGNOSIS — R42 DIZZINESS AND GIDDINESS: ICD-10-CM

## 2025-08-26 DIAGNOSIS — J44.9 CHRONIC OBSTRUCTIVE PULMONARY DISEASE, UNSPECIFIED COPD TYPE: ICD-10-CM

## 2025-08-26 RX ORDER — ALBUTEROL SULFATE 90 UG/1
2 INHALANT RESPIRATORY (INHALATION) EVERY 6 HOURS PRN
Qty: 54 G | Refills: 3 | Status: SHIPPED | OUTPATIENT
Start: 2025-08-26 | End: 2026-08-26

## 2025-08-28 PROBLEM — E87.1 HYPONATREMIA: Status: ACTIVE | Noted: 2025-08-28

## 2025-08-28 PROBLEM — N18.31 STAGE 3A CHRONIC KIDNEY DISEASE: Status: ACTIVE | Noted: 2025-08-28
